# Patient Record
(demographics unavailable — no encounter records)

---

## 2025-07-07 NOTE — HISTORY OF PRESENT ILLNESS
[TextBox_4] : MATIAS presents today for a follow-up visit. 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 Instructed to perform manual retractions several times daily. Returns today for reexamination.

## 2025-07-07 NOTE — CONSULT LETTER
[FreeTextEntry1] : Dear Dr. EVY NOLAND,      I had the pleasure of seeing MATIAS SANDERS for follow up today.  Below is my note regarding the office visit today.      Thank you so very much for allowing me to participate in MATIAS's care.  Please don't hesitate to call me should any questions or issues arise.         Sincerely,     Jayy Lucero MD, FACS, [facility]    Chief, Pediatric Urology     Professor of Urology and Pediatrics     [employer]         President, [employer] - [place] Section    Past-President, [employer]